# Patient Record
Sex: MALE | Race: WHITE | HISPANIC OR LATINO | Employment: FULL TIME | URBAN - METROPOLITAN AREA
[De-identification: names, ages, dates, MRNs, and addresses within clinical notes are randomized per-mention and may not be internally consistent; named-entity substitution may affect disease eponyms.]

---

## 2019-01-25 ENCOUNTER — HOSPITAL ENCOUNTER (OUTPATIENT)
Dept: LAB | Facility: MEDICAL CENTER | Age: 44
End: 2019-01-25
Attending: NURSE PRACTITIONER
Payer: COMMERCIAL

## 2019-01-25 LAB
ALBUMIN SERPL BCP-MCNC: 4.5 G/DL (ref 3.2–4.9)
ALBUMIN/GLOB SERPL: 1.7 G/DL
ALP SERPL-CCNC: 69 U/L (ref 30–99)
ALT SERPL-CCNC: 25 U/L (ref 2–50)
ANION GAP SERPL CALC-SCNC: 5 MMOL/L (ref 0–11.9)
AST SERPL-CCNC: 24 U/L (ref 12–45)
BASOPHILS # BLD AUTO: 0.9 % (ref 0–1.8)
BASOPHILS # BLD: 0.05 K/UL (ref 0–0.12)
BILIRUB SERPL-MCNC: 1.3 MG/DL (ref 0.1–1.5)
BUN SERPL-MCNC: 16 MG/DL (ref 8–22)
CALCIUM SERPL-MCNC: 9.4 MG/DL (ref 8.5–10.5)
CHLORIDE SERPL-SCNC: 106 MMOL/L (ref 96–112)
CHOLEST SERPL-MCNC: 161 MG/DL (ref 100–199)
CO2 SERPL-SCNC: 27 MMOL/L (ref 20–33)
CREAT SERPL-MCNC: 0.8 MG/DL (ref 0.5–1.4)
EOSINOPHIL # BLD AUTO: 0.49 K/UL (ref 0–0.51)
EOSINOPHIL NFR BLD: 8.7 % (ref 0–6.9)
ERYTHROCYTE [DISTWIDTH] IN BLOOD BY AUTOMATED COUNT: 41.9 FL (ref 35.9–50)
FASTING STATUS PATIENT QL REPORTED: NORMAL
GLOBULIN SER CALC-MCNC: 2.6 G/DL (ref 1.9–3.5)
GLUCOSE SERPL-MCNC: 98 MG/DL (ref 65–99)
HCT VFR BLD AUTO: 51.8 % (ref 42–52)
HDLC SERPL-MCNC: 38 MG/DL
HGB BLD-MCNC: 17.2 G/DL (ref 14–18)
IMM GRANULOCYTES # BLD AUTO: 0 K/UL (ref 0–0.11)
IMM GRANULOCYTES NFR BLD AUTO: 0 % (ref 0–0.9)
LDLC SERPL CALC-MCNC: 99 MG/DL
LYMPHOCYTES # BLD AUTO: 1.5 K/UL (ref 1–4.8)
LYMPHOCYTES NFR BLD: 26.5 % (ref 22–41)
MCH RBC QN AUTO: 30.9 PG (ref 27–33)
MCHC RBC AUTO-ENTMCNC: 33.2 G/DL (ref 33.7–35.3)
MCV RBC AUTO: 93.2 FL (ref 81.4–97.8)
MONOCYTES # BLD AUTO: 0.57 K/UL (ref 0–0.85)
MONOCYTES NFR BLD AUTO: 10.1 % (ref 0–13.4)
NEUTROPHILS # BLD AUTO: 3.04 K/UL (ref 1.82–7.42)
NEUTROPHILS NFR BLD: 53.8 % (ref 44–72)
NRBC # BLD AUTO: 0 K/UL
NRBC BLD-RTO: 0 /100 WBC
PLATELET # BLD AUTO: 194 K/UL (ref 164–446)
PMV BLD AUTO: 10 FL (ref 9–12.9)
POTASSIUM SERPL-SCNC: 3.9 MMOL/L (ref 3.6–5.5)
PROT SERPL-MCNC: 7.1 G/DL (ref 6–8.2)
RBC # BLD AUTO: 5.56 M/UL (ref 4.7–6.1)
SODIUM SERPL-SCNC: 138 MMOL/L (ref 135–145)
TRIGL SERPL-MCNC: 121 MG/DL (ref 0–149)
WBC # BLD AUTO: 5.7 K/UL (ref 4.8–10.8)

## 2019-01-25 PROCEDURE — 80053 COMPREHEN METABOLIC PANEL: CPT

## 2019-01-25 PROCEDURE — 85025 COMPLETE CBC W/AUTO DIFF WBC: CPT

## 2019-01-25 PROCEDURE — 80061 LIPID PANEL: CPT

## 2019-01-25 PROCEDURE — 36415 COLL VENOUS BLD VENIPUNCTURE: CPT

## 2021-10-16 ENCOUNTER — APPOINTMENT (OUTPATIENT)
Dept: RADIOLOGY | Facility: MEDICAL CENTER | Age: 46
End: 2021-10-16
Attending: EMERGENCY MEDICINE
Payer: COMMERCIAL

## 2021-10-16 ENCOUNTER — HOSPITAL ENCOUNTER (EMERGENCY)
Facility: MEDICAL CENTER | Age: 46
End: 2021-10-16
Attending: EMERGENCY MEDICINE
Payer: COMMERCIAL

## 2021-10-16 VITALS
TEMPERATURE: 99.3 F | RESPIRATION RATE: 18 BRPM | HEART RATE: 85 BPM | BODY MASS INDEX: 33.34 KG/M2 | OXYGEN SATURATION: 97 % | WEIGHT: 220 LBS | SYSTOLIC BLOOD PRESSURE: 139 MMHG | DIASTOLIC BLOOD PRESSURE: 91 MMHG | HEIGHT: 68 IN

## 2021-10-16 DIAGNOSIS — S82.142A CLOSED FRACTURE OF LEFT TIBIAL PLATEAU, INITIAL ENCOUNTER: ICD-10-CM

## 2021-10-16 PROCEDURE — 96374 THER/PROPH/DIAG INJ IV PUSH: CPT

## 2021-10-16 PROCEDURE — 73630 X-RAY EXAM OF FOOT: CPT | Mod: LT

## 2021-10-16 PROCEDURE — 700111 HCHG RX REV CODE 636 W/ 250 OVERRIDE (IP): Performed by: EMERGENCY MEDICINE

## 2021-10-16 PROCEDURE — 73564 X-RAY EXAM KNEE 4 OR MORE: CPT | Mod: LT

## 2021-10-16 PROCEDURE — 73700 CT LOWER EXTREMITY W/O DYE: CPT | Mod: LT

## 2021-10-16 PROCEDURE — 99285 EMERGENCY DEPT VISIT HI MDM: CPT

## 2021-10-16 PROCEDURE — A9270 NON-COVERED ITEM OR SERVICE: HCPCS | Performed by: EMERGENCY MEDICINE

## 2021-10-16 PROCEDURE — 700102 HCHG RX REV CODE 250 W/ 637 OVERRIDE(OP): Performed by: EMERGENCY MEDICINE

## 2021-10-16 RX ORDER — OXYCODONE HYDROCHLORIDE AND ACETAMINOPHEN 5; 325 MG/1; MG/1
2 TABLET ORAL ONCE
Status: COMPLETED | OUTPATIENT
Start: 2021-10-16 | End: 2021-10-16

## 2021-10-16 RX ORDER — OXYCODONE HYDROCHLORIDE AND ACETAMINOPHEN 5; 325 MG/1; MG/1
1 TABLET ORAL EVERY 6 HOURS PRN
Qty: 20 TABLET | Refills: 0 | Status: SHIPPED | OUTPATIENT
Start: 2021-10-16 | End: 2021-10-21

## 2021-10-16 RX ORDER — MORPHINE SULFATE 4 MG/ML
4 INJECTION, SOLUTION INTRAMUSCULAR; INTRAVENOUS ONCE
Status: COMPLETED | OUTPATIENT
Start: 2021-10-16 | End: 2021-10-16

## 2021-10-16 RX ADMIN — MORPHINE SULFATE 4 MG: 4 INJECTION INTRAVENOUS at 18:21

## 2021-10-16 RX ADMIN — OXYCODONE HYDROCHLORIDE AND ACETAMINOPHEN 2 TABLET: 5; 325 TABLET ORAL at 20:00

## 2021-10-16 NOTE — ED TRIAGE NOTES
"Chief Complaint   Patient presents with   • T-5000     Pt states he fell walking out of the doorway from his house onto his left knee. Denies hitting his head. CMS intact.     Pt wheeled back to triage room. GCS 15. NAD. VSS on RA    Pt denies s/sx of COVID or recent exposure. Pt is vaccinated against COVID    Pt returned to lobby. Pt educated on triage process. Pt understands to notify staff of any changes or worsening of symptoms.    /99   Pulse 82   Temp 36.3 °C (97.4 °F) (Temporal)   Resp 18   Ht 1.727 m (5' 8\")   Wt 99.8 kg (220 lb)   SpO2 96%   BMI 33.45 kg/m²     "

## 2021-10-17 NOTE — ED PROVIDER NOTES
"ED Provider Note    CHIEF COMPLAINT   Chief Complaint   Patient presents with   • T-5000     Pt states he fell walking out of the doorway from his house onto his left knee. Denies hitting his head. CMS intact.       HPI   Anand Padilla is a 46 y.o. male who presents with severe left knee pain and swelling since tripping going at his door and falling onto his knee on concrete stairs.  He also has pain of the right great toe.  Pain is quite severe and he cannot walk.  No blood thinner use.  Denies other injury to head neck back chest or abdomen.    REVIEW OF SYSTEMS   Pertinent positives: Very left knee pain and swelling, fall, direct blow, left great toe pain.  Pertinent negatives: Hip pain, spine pain, weakness, numbness.    PAST MEDICAL HISTORY   Denies    CURRENT MEDICATIONS   Home Medications     Reviewed by Anjali Medley R.N. (Registered Nurse) on 10/16/21 at 1601  Med List Status: Partial   Medication Last Dose Status        Patient Jeffy Taking any Medications                       ALLERGIES   Not on File    PHYSICAL EXAM  VITAL SIGNS: /99   Pulse 82   Temp 36.3 °C (97.4 °F) (Temporal)   Resp 18   Ht 1.727 m (5' 8\")   Wt 99.8 kg (220 lb)   SpO2 96%   BMI 33.45 kg/m²   Constitutional: Well developed, Well nourished, well-appearing, appears to be in pain.  HENT: Atraumatic.   Cardiovascular:  Peripheral pulses dorsalis pedis pulses 2-3+ bilaterally.  Skin: Intact without wound or bruising.  Erythema and scale distal to the patella left knee chronic.   Musculoskeletal: Left knee effusion, tenderness distal to the patella and on the medial and lateral joint line, crepitus with mild flexion and extension palpable in the popliteal fossa.  Patient refuses full extension of the knee.  Tenderness over the left great toe without deformity or crepitus.  Remainder of thigh leg and ankle atraumatic  Compartments: Soft thigh and leg compartments  Neurologic: Toe flexion extension and sensation " preserved    RADIOLOGY/PROCEDURES  CT-KNEE W/O PLUS RECONS LEFT   Final Result      1.  Highly comminuted, displaced and depressed lateral tibial plateau fracture.   2.  Mild flattening and subchondral sclerosis involving the lateral distal femoral condyle suggesting a mild impaction fracture/injury.   3.  Lipohemarthrosis.      DX-FOOT-COMPLETE 3+ LEFT   Final Result      No acute osseous abnormality.      DX-KNEE COMPLETE 4+ LEFT   Final Result      1.  Acute, comminuted, split-depression fracture of the lateral tibial plateau.   2.  Lipohemarthrosis.          COURSE & MEDICAL DECISION MAKING  Case discussed twice with Dr. Kaushik hickman.    Patient placed in knee immobilizer and give crutches.    Medications   morphine (pf) 4 mg/mL injection 4 mg (4 mg Intravenous Given 10/16/21 1821)   oxyCODONE-acetaminophen (PERCOCET) 5-325 MG per tablet 2 Tablet (2 Tablets Oral Given 10/16/21 2000)     This patient presents after a fall onto his knee with a hemarthrosis and a lateral tibial plateau fracture.  He has a sprained or contused left great toe and no other injury to head, spine, torso or extremities.  He is neurovascularly intact    PLAN:  Discharge Medication List as of 10/16/2021  7:53 PM      START taking these medications    Details   oxyCODONE-acetaminophen (PERCOCET) 5-325 MG Tab Take 1 Tablet by mouth every 6 hours as needed (moderate to severe pain) for up to 5 days., Disp-20 Tablet, R-0, Normal           Prescription monitoring queried and score 0  Opiate risk tool utilized and patient low risk  Informed consent obtained for opiate analgesic  Indication opiate analgesic fracture pain    NSAIDS  Non weight bearing  Tibial plateau fracture handout given    Denis Faulkner M.D.  [address] [place] [address]  [phone]      Call is Assistant Jack at [phone] on Monday for appointment        CONDITION: good    FINAL IMPRESSION  1. Closed fracture of left tibial plateau, initial encounter    2.       Left great toe sprain  3.      Fall, initial encounter        Electronically signed by: Tank Javed M.D., 10/16/2021 6:03 PM

## 2021-10-17 NOTE — ED NOTES
Discharge instructions given to pt regarding follow up appointments, tibial fracture and reasons to return to the ED. Education regarding medications for pain given. All education given using  ipad. Pt verbalized understanding. PIV removed. Knee immobilizer applied to left knee. crutches provided. Documents signed

## 2021-10-17 NOTE — DISCHARGE INSTRUCTIONS
Floridalma ibuprofen y percocet contra dolor.  Llame Vance con Dr. Kaushik Celaya para sergio.  No camina en la pierna.

## 2021-10-18 PROBLEM — S82.142A TIBIAL PLATEAU FRACTURE, LEFT: Status: ACTIVE | Noted: 2021-10-18

## 2021-10-19 ENCOUNTER — HOSPITAL ENCOUNTER (OUTPATIENT)
Facility: MEDICAL CENTER | Age: 46
End: 2021-10-19
Attending: ORTHOPAEDIC SURGERY | Admitting: ORTHOPAEDIC SURGERY
Payer: COMMERCIAL

## 2021-10-19 ENCOUNTER — ANESTHESIA (OUTPATIENT)
Dept: SURGERY | Facility: MEDICAL CENTER | Age: 46
End: 2021-10-19
Payer: COMMERCIAL

## 2021-10-19 ENCOUNTER — APPOINTMENT (OUTPATIENT)
Dept: RADIOLOGY | Facility: MEDICAL CENTER | Age: 46
End: 2021-10-19
Attending: ORTHOPAEDIC SURGERY
Payer: COMMERCIAL

## 2021-10-19 ENCOUNTER — ANESTHESIA EVENT (OUTPATIENT)
Dept: SURGERY | Facility: MEDICAL CENTER | Age: 46
End: 2021-10-19
Payer: COMMERCIAL

## 2021-10-19 VITALS
WEIGHT: 197.75 LBS | TEMPERATURE: 98 F | BODY MASS INDEX: 29.97 KG/M2 | RESPIRATION RATE: 15 BRPM | HEART RATE: 95 BPM | SYSTOLIC BLOOD PRESSURE: 136 MMHG | OXYGEN SATURATION: 96 % | HEIGHT: 68 IN | DIASTOLIC BLOOD PRESSURE: 71 MMHG

## 2021-10-19 DIAGNOSIS — S82.142A CLOSED FRACTURE OF LEFT TIBIAL PLATEAU, INITIAL ENCOUNTER: ICD-10-CM

## 2021-10-19 LAB
ANION GAP SERPL CALC-SCNC: 9 MMOL/L (ref 7–16)
BUN SERPL-MCNC: 8 MG/DL (ref 8–22)
CALCIUM SERPL-MCNC: 8.5 MG/DL (ref 8.5–10.5)
CHLORIDE SERPL-SCNC: 103 MMOL/L (ref 96–112)
CO2 SERPL-SCNC: 23 MMOL/L (ref 20–33)
CREAT SERPL-MCNC: 0.59 MG/DL (ref 0.5–1.4)
ERYTHROCYTE [DISTWIDTH] IN BLOOD BY AUTOMATED COUNT: 41.9 FL (ref 35.9–50)
EXTERNAL QUALITY CONTROL: NORMAL
GLUCOSE SERPL-MCNC: 127 MG/DL (ref 65–99)
HCT VFR BLD AUTO: 46.3 % (ref 42–52)
HGB BLD-MCNC: 15.4 G/DL (ref 14–18)
MCH RBC QN AUTO: 31.2 PG (ref 27–33)
MCHC RBC AUTO-ENTMCNC: 33.3 G/DL (ref 33.7–35.3)
MCV RBC AUTO: 93.9 FL (ref 81.4–97.8)
PLATELET # BLD AUTO: 191 K/UL (ref 164–446)
PMV BLD AUTO: 9.3 FL (ref 9–12.9)
POTASSIUM SERPL-SCNC: 4 MMOL/L (ref 3.6–5.5)
RBC # BLD AUTO: 4.93 M/UL (ref 4.7–6.1)
SARS-COV+SARS-COV-2 AG RESP QL IA.RAPID: NEGATIVE
SODIUM SERPL-SCNC: 135 MMOL/L (ref 135–145)
WBC # BLD AUTO: 6.5 K/UL (ref 4.8–10.8)

## 2021-10-19 PROCEDURE — 160036 HCHG PACU - EA ADDL 30 MINS PHASE I: Performed by: ORTHOPAEDIC SURGERY

## 2021-10-19 PROCEDURE — 27535 TREAT KNEE FRACTURE: CPT | Mod: 80ROC,LT | Performed by: STUDENT IN AN ORGANIZED HEALTH CARE EDUCATION/TRAINING PROGRAM

## 2021-10-19 PROCEDURE — 700105 HCHG RX REV CODE 258: Performed by: ORTHOPAEDIC SURGERY

## 2021-10-19 PROCEDURE — 700111 HCHG RX REV CODE 636 W/ 250 OVERRIDE (IP): Performed by: ANESTHESIOLOGY

## 2021-10-19 PROCEDURE — 160041 HCHG SURGERY MINUTES - EA ADDL 1 MIN LEVEL 4: Performed by: ORTHOPAEDIC SURGERY

## 2021-10-19 PROCEDURE — 85027 COMPLETE CBC AUTOMATED: CPT

## 2021-10-19 PROCEDURE — 160048 HCHG OR STATISTICAL LEVEL 1-5: Performed by: ORTHOPAEDIC SURGERY

## 2021-10-19 PROCEDURE — 501838 HCHG SUTURE GENERAL: Performed by: ORTHOPAEDIC SURGERY

## 2021-10-19 PROCEDURE — 73560 X-RAY EXAM OF KNEE 1 OR 2: CPT | Mod: LT

## 2021-10-19 PROCEDURE — 700102 HCHG RX REV CODE 250 W/ 637 OVERRIDE(OP): Performed by: ANESTHESIOLOGY

## 2021-10-19 PROCEDURE — 160035 HCHG PACU - 1ST 60 MINS PHASE I: Performed by: ORTHOPAEDIC SURGERY

## 2021-10-19 PROCEDURE — 160029 HCHG SURGERY MINUTES - 1ST 30 MINS LEVEL 4: Performed by: ORTHOPAEDIC SURGERY

## 2021-10-19 PROCEDURE — 160025 RECOVERY II MINUTES (STATS): Performed by: ORTHOPAEDIC SURGERY

## 2021-10-19 PROCEDURE — C1713 ANCHOR/SCREW BN/BN,TIS/BN: HCPCS | Performed by: ORTHOPAEDIC SURGERY

## 2021-10-19 PROCEDURE — 160046 HCHG PACU - 1ST 60 MINS PHASE II: Performed by: ORTHOPAEDIC SURGERY

## 2021-10-19 PROCEDURE — 27535 TREAT KNEE FRACTURE: CPT | Mod: LT | Performed by: ORTHOPAEDIC SURGERY

## 2021-10-19 PROCEDURE — 87426 SARSCOV CORONAVIRUS AG IA: CPT | Performed by: ORTHOPAEDIC SURGERY

## 2021-10-19 PROCEDURE — L1830 KO IMMOB CANVAS LONG PRE OTS: HCPCS | Performed by: ORTHOPAEDIC SURGERY

## 2021-10-19 PROCEDURE — 160002 HCHG RECOVERY MINUTES (STAT): Performed by: ORTHOPAEDIC SURGERY

## 2021-10-19 PROCEDURE — 500881 HCHG PACK, EXTREMITY: Performed by: ORTHOPAEDIC SURGERY

## 2021-10-19 PROCEDURE — 20933 HEMICRT INTRCLRY ALGRFT PRTL: CPT | Mod: 59 | Performed by: ORTHOPAEDIC SURGERY

## 2021-10-19 PROCEDURE — 700101 HCHG RX REV CODE 250: Performed by: ORTHOPAEDIC SURGERY

## 2021-10-19 PROCEDURE — 80048 BASIC METABOLIC PNL TOTAL CA: CPT

## 2021-10-19 PROCEDURE — A9270 NON-COVERED ITEM OR SERVICE: HCPCS | Performed by: ANESTHESIOLOGY

## 2021-10-19 PROCEDURE — 700111 HCHG RX REV CODE 636 W/ 250 OVERRIDE (IP): Performed by: ORTHOPAEDIC SURGERY

## 2021-10-19 PROCEDURE — 700101 HCHG RX REV CODE 250: Performed by: ANESTHESIOLOGY

## 2021-10-19 PROCEDURE — 20933 HEMICRT INTRCLRY ALGRFT PRTL: CPT | Mod: 80ROC,59 | Performed by: STUDENT IN AN ORGANIZED HEALTH CARE EDUCATION/TRAINING PROGRAM

## 2021-10-19 PROCEDURE — 160009 HCHG ANES TIME/MIN: Performed by: ORTHOPAEDIC SURGERY

## 2021-10-19 PROCEDURE — A6454 SELF-ADHER BAND W>=3" <5"/YD: HCPCS | Performed by: ORTHOPAEDIC SURGERY

## 2021-10-19 DEVICE — SCREW 3.5 MM NON-LOCKING TI X 85MM LONG (6TX4=24): Type: IMPLANTABLE DEVICE | Site: LEG | Status: FUNCTIONAL

## 2021-10-19 DEVICE — PLATE LATERIAL PROXIMAL TIBIA 6H LEFT (2TX1=2): Type: IMPLANTABLE DEVICE | Site: LEG | Status: FUNCTIONAL

## 2021-10-19 DEVICE — GRAFT BONE CANCELLOUS FREEZE DRIED CHIPS ALLOSOURCE 15CC (1EA): Type: IMPLANTABLE DEVICE | Site: LEG | Status: FUNCTIONAL

## 2021-10-19 DEVICE — SCREW 3.5 MM NON-LOCKING TI X 36MM LONG (6TX8=48): Type: IMPLANTABLE DEVICE | Site: LEG | Status: FUNCTIONAL

## 2021-10-19 DEVICE — SCREW 3.5 MM NON-LOCKING TI X 70MM LONG (6TX4=24): Type: IMPLANTABLE DEVICE | Site: LEG | Status: FUNCTIONAL

## 2021-10-19 DEVICE — WIRE 2.0MMX150MM K-WIRE (10 PACK) (6TX10=60): Type: IMPLANTABLE DEVICE | Site: LEG | Status: FUNCTIONAL

## 2021-10-19 DEVICE — SCREW 3.5 MM NON-LOCKING TI X 65MM LONG (6TX8=48): Type: IMPLANTABLE DEVICE | Site: LEG | Status: FUNCTIONAL

## 2021-10-19 DEVICE — SCREW 3.5 MM NON-LOCKING TI X 40MM LONG (6TX8=48): Type: IMPLANTABLE DEVICE | Site: LEG | Status: FUNCTIONAL

## 2021-10-19 RX ORDER — GABAPENTIN 300 MG/1
300 CAPSULE ORAL ONCE
Status: COMPLETED | OUTPATIENT
Start: 2021-10-19 | End: 2021-10-19

## 2021-10-19 RX ORDER — HYDROMORPHONE HYDROCHLORIDE 1 MG/ML
0.4 INJECTION, SOLUTION INTRAMUSCULAR; INTRAVENOUS; SUBCUTANEOUS
Status: DISCONTINUED | OUTPATIENT
Start: 2021-10-19 | End: 2021-10-19 | Stop reason: HOSPADM

## 2021-10-19 RX ORDER — ONDANSETRON 2 MG/ML
4 INJECTION INTRAMUSCULAR; INTRAVENOUS
Status: DISCONTINUED | OUTPATIENT
Start: 2021-10-19 | End: 2021-10-19 | Stop reason: HOSPADM

## 2021-10-19 RX ORDER — ONDANSETRON 2 MG/ML
INJECTION INTRAMUSCULAR; INTRAVENOUS PRN
Status: DISCONTINUED | OUTPATIENT
Start: 2021-10-19 | End: 2021-10-19 | Stop reason: SURG

## 2021-10-19 RX ORDER — MAGNESIUM HYDROXIDE 1200 MG/15ML
LIQUID ORAL
Status: COMPLETED | OUTPATIENT
Start: 2021-10-19 | End: 2021-10-19

## 2021-10-19 RX ORDER — OXYCODONE HCL 5 MG/5 ML
10 SOLUTION, ORAL ORAL
Status: COMPLETED | OUTPATIENT
Start: 2021-10-19 | End: 2021-10-19

## 2021-10-19 RX ORDER — HYDRALAZINE HYDROCHLORIDE 20 MG/ML
5 INJECTION INTRAMUSCULAR; INTRAVENOUS
Status: DISCONTINUED | OUTPATIENT
Start: 2021-10-19 | End: 2021-10-19 | Stop reason: HOSPADM

## 2021-10-19 RX ORDER — SODIUM CHLORIDE, SODIUM LACTATE, POTASSIUM CHLORIDE, CALCIUM CHLORIDE 600; 310; 30; 20 MG/100ML; MG/100ML; MG/100ML; MG/100ML
INJECTION, SOLUTION INTRAVENOUS CONTINUOUS
Status: DISCONTINUED | OUTPATIENT
Start: 2021-10-19 | End: 2021-10-19 | Stop reason: HOSPADM

## 2021-10-19 RX ORDER — HYDROMORPHONE HYDROCHLORIDE 2 MG/ML
INJECTION, SOLUTION INTRAMUSCULAR; INTRAVENOUS; SUBCUTANEOUS PRN
Status: DISCONTINUED | OUTPATIENT
Start: 2021-10-19 | End: 2021-10-19 | Stop reason: SURG

## 2021-10-19 RX ORDER — HYDROMORPHONE HYDROCHLORIDE 1 MG/ML
0.2 INJECTION, SOLUTION INTRAMUSCULAR; INTRAVENOUS; SUBCUTANEOUS
Status: DISCONTINUED | OUTPATIENT
Start: 2021-10-19 | End: 2021-10-19 | Stop reason: HOSPADM

## 2021-10-19 RX ORDER — HYDROMORPHONE HYDROCHLORIDE 1 MG/ML
0.5 INJECTION, SOLUTION INTRAMUSCULAR; INTRAVENOUS; SUBCUTANEOUS
Status: DISCONTINUED | OUTPATIENT
Start: 2021-10-19 | End: 2021-10-19 | Stop reason: HOSPADM

## 2021-10-19 RX ORDER — ACETAMINOPHEN 500 MG
500 TABLET ORAL ONCE
Status: COMPLETED | OUTPATIENT
Start: 2021-10-19 | End: 2021-10-19

## 2021-10-19 RX ORDER — MIDAZOLAM HYDROCHLORIDE 1 MG/ML
INJECTION INTRAMUSCULAR; INTRAVENOUS PRN
Status: DISCONTINUED | OUTPATIENT
Start: 2021-10-19 | End: 2021-10-19 | Stop reason: SURG

## 2021-10-19 RX ORDER — ACETAMINOPHEN 500 MG
1000 TABLET ORAL ONCE
Status: DISCONTINUED | OUTPATIENT
Start: 2021-10-19 | End: 2021-10-19

## 2021-10-19 RX ORDER — DEXAMETHASONE SODIUM PHOSPHATE 4 MG/ML
INJECTION, SOLUTION INTRA-ARTICULAR; INTRALESIONAL; INTRAMUSCULAR; INTRAVENOUS; SOFT TISSUE PRN
Status: DISCONTINUED | OUTPATIENT
Start: 2021-10-19 | End: 2021-10-19 | Stop reason: SURG

## 2021-10-19 RX ORDER — CEFAZOLIN SODIUM 1 G/3ML
2 INJECTION, POWDER, FOR SOLUTION INTRAMUSCULAR; INTRAVENOUS ONCE
Status: DISCONTINUED | OUTPATIENT
Start: 2021-10-19 | End: 2021-10-19 | Stop reason: HOSPADM

## 2021-10-19 RX ORDER — CEFAZOLIN SODIUM 1 G/3ML
INJECTION, POWDER, FOR SOLUTION INTRAMUSCULAR; INTRAVENOUS PRN
Status: DISCONTINUED | OUTPATIENT
Start: 2021-10-19 | End: 2021-10-19 | Stop reason: SURG

## 2021-10-19 RX ORDER — CELECOXIB 200 MG/1
200 CAPSULE ORAL ONCE
Status: COMPLETED | OUTPATIENT
Start: 2021-10-19 | End: 2021-10-19

## 2021-10-19 RX ORDER — LIDOCAINE HYDROCHLORIDE 20 MG/ML
INJECTION, SOLUTION EPIDURAL; INFILTRATION; INTRACAUDAL; PERINEURAL PRN
Status: DISCONTINUED | OUTPATIENT
Start: 2021-10-19 | End: 2021-10-19 | Stop reason: SURG

## 2021-10-19 RX ORDER — OXYCODONE AND ACETAMINOPHEN 10; 325 MG/1; MG/1
1-2 TABLET ORAL EVERY 4 HOURS PRN
Qty: 15 TABLET | Refills: 0 | Status: SHIPPED | OUTPATIENT
Start: 2021-10-19 | End: 2021-11-18

## 2021-10-19 RX ORDER — MEPERIDINE HYDROCHLORIDE 25 MG/ML
12.5 INJECTION INTRAMUSCULAR; INTRAVENOUS; SUBCUTANEOUS
Status: DISCONTINUED | OUTPATIENT
Start: 2021-10-19 | End: 2021-10-19 | Stop reason: HOSPADM

## 2021-10-19 RX ORDER — HALOPERIDOL 5 MG/ML
1 INJECTION INTRAMUSCULAR
Status: DISCONTINUED | OUTPATIENT
Start: 2021-10-19 | End: 2021-10-19 | Stop reason: HOSPADM

## 2021-10-19 RX ORDER — DIPHENHYDRAMINE HYDROCHLORIDE 50 MG/ML
12.5 INJECTION INTRAMUSCULAR; INTRAVENOUS
Status: DISCONTINUED | OUTPATIENT
Start: 2021-10-19 | End: 2021-10-19 | Stop reason: HOSPADM

## 2021-10-19 RX ORDER — OXYCODONE HCL 5 MG/5 ML
5 SOLUTION, ORAL ORAL
Status: COMPLETED | OUTPATIENT
Start: 2021-10-19 | End: 2021-10-19

## 2021-10-19 RX ORDER — SODIUM CHLORIDE, SODIUM LACTATE, POTASSIUM CHLORIDE, CALCIUM CHLORIDE 600; 310; 30; 20 MG/100ML; MG/100ML; MG/100ML; MG/100ML
INJECTION, SOLUTION INTRAVENOUS CONTINUOUS
Status: ACTIVE | OUTPATIENT
Start: 2021-10-19 | End: 2021-10-19

## 2021-10-19 RX ORDER — DEXMEDETOMIDINE HYDROCHLORIDE 100 UG/ML
INJECTION, SOLUTION INTRAVENOUS PRN
Status: DISCONTINUED | OUTPATIENT
Start: 2021-10-19 | End: 2021-10-19 | Stop reason: SURG

## 2021-10-19 RX ORDER — KETOROLAC TROMETHAMINE 30 MG/ML
INJECTION, SOLUTION INTRAMUSCULAR; INTRAVENOUS
Status: DISCONTINUED | OUTPATIENT
Start: 2021-10-19 | End: 2021-10-19 | Stop reason: HOSPADM

## 2021-10-19 RX ORDER — MORPHINE SULFATE 1 MG/ML
INJECTION, SOLUTION EPIDURAL; INTRATHECAL; INTRAVENOUS
Status: DISCONTINUED | OUTPATIENT
Start: 2021-10-19 | End: 2021-10-19 | Stop reason: HOSPADM

## 2021-10-19 RX ORDER — MAGNESIUM SULFATE HEPTAHYDRATE 500 MG/ML
INJECTION, SOLUTION INTRAMUSCULAR; INTRAVENOUS PRN
Status: DISCONTINUED | OUTPATIENT
Start: 2021-10-19 | End: 2021-10-19 | Stop reason: SURG

## 2021-10-19 RX ORDER — SODIUM CHLORIDE 9 MG/ML
INJECTION, SOLUTION INTRAMUSCULAR; INTRAVENOUS; SUBCUTANEOUS
Status: DISCONTINUED | OUTPATIENT
Start: 2021-10-19 | End: 2021-10-19 | Stop reason: HOSPADM

## 2021-10-19 RX ORDER — BUPIVACAINE HYDROCHLORIDE 2.5 MG/ML
INJECTION, SOLUTION EPIDURAL; INFILTRATION; INTRACAUDAL
Status: DISCONTINUED | OUTPATIENT
Start: 2021-10-19 | End: 2021-10-19 | Stop reason: HOSPADM

## 2021-10-19 RX ADMIN — FENTANYL CITRATE 50 MCG: 50 INJECTION INTRAMUSCULAR; INTRAVENOUS at 13:55

## 2021-10-19 RX ADMIN — DEXMEDETOMIDINE 50 MCG: 200 INJECTION, SOLUTION INTRAVENOUS at 11:25

## 2021-10-19 RX ADMIN — PROPOFOL 200 MG: 10 INJECTION, EMULSION INTRAVENOUS at 11:04

## 2021-10-19 RX ADMIN — HYDROMORPHONE HYDROCHLORIDE 1 MG: 2 INJECTION, SOLUTION INTRAMUSCULAR; INTRAVENOUS; SUBCUTANEOUS at 11:04

## 2021-10-19 RX ADMIN — LIDOCAINE HYDROCHLORIDE 0.5 ML: 10 INJECTION, SOLUTION EPIDURAL; INFILTRATION; INTRACAUDAL; PERINEURAL at 09:01

## 2021-10-19 RX ADMIN — SODIUM CHLORIDE, POTASSIUM CHLORIDE, SODIUM LACTATE AND CALCIUM CHLORIDE: 600; 310; 30; 20 INJECTION, SOLUTION INTRAVENOUS at 12:20

## 2021-10-19 RX ADMIN — MIDAZOLAM HYDROCHLORIDE 2 MG: 1 INJECTION, SOLUTION INTRAMUSCULAR; INTRAVENOUS at 11:00

## 2021-10-19 RX ADMIN — ACETAMINOPHEN 500 MG: 500 TABLET ORAL at 09:10

## 2021-10-19 RX ADMIN — DEXAMETHASONE SODIUM PHOSPHATE 8 MG: 4 INJECTION, SOLUTION INTRA-ARTICULAR; INTRALESIONAL; INTRAMUSCULAR; INTRAVENOUS; SOFT TISSUE at 11:08

## 2021-10-19 RX ADMIN — CEFAZOLIN 2 G: 330 INJECTION, POWDER, FOR SOLUTION INTRAMUSCULAR; INTRAVENOUS at 11:00

## 2021-10-19 RX ADMIN — CELECOXIB 200 MG: 200 CAPSULE ORAL at 09:04

## 2021-10-19 RX ADMIN — FENTANYL CITRATE 50 MCG: 50 INJECTION INTRAMUSCULAR; INTRAVENOUS at 14:35

## 2021-10-19 RX ADMIN — SODIUM CHLORIDE, POTASSIUM CHLORIDE, SODIUM LACTATE AND CALCIUM CHLORIDE: 600; 310; 30; 20 INJECTION, SOLUTION INTRAVENOUS at 09:01

## 2021-10-19 RX ADMIN — LIDOCAINE HYDROCHLORIDE 100 MG: 20 INJECTION, SOLUTION EPIDURAL; INFILTRATION; INTRACAUDAL at 11:04

## 2021-10-19 RX ADMIN — OXYCODONE HYDROCHLORIDE 10 MG: 5 SOLUTION ORAL at 13:38

## 2021-10-19 RX ADMIN — FENTANYL CITRATE 25 MCG: 50 INJECTION INTRAMUSCULAR; INTRAVENOUS at 15:02

## 2021-10-19 RX ADMIN — HYDROMORPHONE HYDROCHLORIDE 1 MG: 2 INJECTION, SOLUTION INTRAMUSCULAR; INTRAVENOUS; SUBCUTANEOUS at 11:11

## 2021-10-19 RX ADMIN — MAGNESIUM SULFATE HEPTAHYDRATE 3 G: 500 INJECTION, SOLUTION INTRAMUSCULAR; INTRAVENOUS at 11:08

## 2021-10-19 RX ADMIN — ONDANSETRON 4 MG: 2 INJECTION INTRAMUSCULAR; INTRAVENOUS at 12:40

## 2021-10-19 RX ADMIN — GABAPENTIN 300 MG: 300 CAPSULE ORAL at 09:04

## 2021-10-19 ASSESSMENT — PAIN DESCRIPTION - PAIN TYPE
TYPE: ACUTE PAIN
TYPE: SURGICAL PAIN
TYPE: SURGICAL PAIN

## 2021-10-19 ASSESSMENT — PAIN SCALES - GENERAL: PAIN_LEVEL: 2

## 2021-10-19 NOTE — ANESTHESIA PROCEDURE NOTES
Airway    Date/Time: 10/19/2021 11:04 AM  Performed by: Arnaud Sharp M.D.  Authorized by: Arnaud Sharp M.D.     Location:  OR  Urgency:  Elective  Indications for Airway Management:  Anesthesia      Spontaneous Ventilation: absent    Sedation Level:  Deep  Preoxygenated: Yes    Final Airway Type:  Supraglottic airway  Final Supraglottic Airway:  Standard LMA    SGA Size:  4  Number of Attempts at Approach:  1   Atraumatic insertion, good seal

## 2021-10-19 NOTE — ANESTHESIA PREPROCEDURE EVALUATION
Mild asthma. Denies: MI/CHF/smoking/CVA/DM/CKD      Relevant Problems   No relevant active problems       Physical Exam    Airway   Mallampati: II  TM distance: >3 FB  Neck ROM: full       Cardiovascular - normal exam  Rhythm: regular  Rate: normal  (-) murmur     Dental - normal exam           Pulmonary - normal exam  Breath sounds clear to auscultation     Abdominal    Neurological - normal exam                 Anesthesia Plan    ASA 2       Plan - general       Airway plan will be LMA          Induction: intravenous    Postoperative Plan: Postoperative administration of opioids is intended.    Pertinent diagnostic labs and testing reviewed    Informed Consent:    Anesthetic plan and risks discussed with patient.    Use of blood products discussed with: patient whom consented to blood products.

## 2021-10-19 NOTE — ANESTHESIA POSTPROCEDURE EVALUATION
Patient: Anand Padilla    Procedure Summary     Date: 10/19/21 Room / Location: [facility] OR 08 / SURGERY [facility]    Anesthesia Start: 1100 Anesthesia Stop: 1255    Procedures:       ORIF, FRACTURE, TIBIA, PLATEAU LEFT (Left Knee)      BONE GRAFT- ALLOGRAFT (Left Leg Lower) Diagnosis:       Tibial plateau fracture, left      (Tibial plateau fracture, left [S82.142A])    Surgeons: Denis Faulkner M.D. Responsible Provider: Arnaud Sharp M.D.    Anesthesia Type: general ASA Status: 2          Final Anesthesia Type: general  Last vitals  BP   Blood Pressure: 124/83    Temp   36.8 °C (98.2 °F)    Pulse   90   Resp   18    SpO2   92 %      Anesthesia Post Evaluation    Patient location during evaluation: PACU  Patient participation: complete - patient participated  Level of consciousness: awake and alert  Pain score: 2    Airway patency: patent  Anesthetic complications: no  Cardiovascular status: hemodynamically stable  Respiratory status: acceptable  Hydration status: euvolemic    PONV: none          No complications documented.     Nurse Pain Score: 2 (NPRS)

## 2021-10-19 NOTE — H&P
Surgery Orthopedic History & Physical Note    Date  10/19/2021    Primary Care Physician  Pcp Pt States None    CC  Pre-Op Diagnosis Codes:     * Tibial plateau fracture, left [S82.142A]    HPI  This is a 46 y.o. male who presented with with left tibial plateau fracture, had CT and discgarged for delayed fiation.    Past Medical History:   Diagnosis Date   • Asthma        History reviewed. No pertinent surgical history.    Current Facility-Administered Medications   Medication Dose Route Frequency Provider Last Rate Last Admin   • lidocaine (XYLOCAINE) 1 % injection 0.5 mL  0.5 mL Intradermal Once PRN Denis Faulkner M.D.   0.5 mL at 10/19/21 0901   • ceFAZolin (ANCEF) injection 2 g  2 g Intravenous Once Denis Faulkner M.D.       • bupivacaine 0.25% (SENSORCAINE-MARCAINE) pf injection    Intra-Op Once PRN Denis Faulkner M.D.   60 mL at 10/19/21 1141   • ketorolac (TORADOL) injection    Intra-Op Once PRN Denis Faulkner M.D.   30 mg at 10/19/21 1142   • morphine pf (DURAMORPH) injection    Intra-Op Once PRN Denis Faulkner M.D.   4 mg at 10/19/21 1142   • normal saline (PF)    Intra-Op Once PRN Denis Faulkner M.D.   40 mL at 10/19/21 1142   • sodium chloride for irrigation 0.9 % irrigant    Intra-Op Continuous Denis Faulkner M.D.   1,000 mL at 10/19/21 1143     Facility-Administered Medications Ordered in Other Encounters   Medication Dose Route Frequency Provider Last Rate Last Admin   • ceFAZolin (ANCEF) injection   Intravenous PRN Arnaud Sharp M.D.   2 g at 10/19/21 1100   • HYDROmorphone (DILAUDID) injection   Intravenous PRN Arnaud Sharp M.D.   1 mg at 10/19/21 1111   • midazolam (Versed) 2 MG/2ML injection   Intravenous PRN Arnaud Sharp M.D.   2 mg at 10/19/21 1100   • propofol (DIPRIVAN) injection   Intravenous PRN Arnaud Sharp M.D.   200 mg at 10/19/21 1104   • lidocaine PF (XYLOCAINE-MPF) 2 % injection PF   Intravenous PRN Arnaud Sharp M.D.   100 mg at 10/19/21  1104   • dexamethasone (DECADRON) injection   Intravenous PRN Arnaud Sharp M.D.   8 mg at 10/19/21 1108   • magnesium sulfate 50 % injection   Intravenous PRN Arnaud Sharp M.D.   3 g at 10/19/21 1108   • dexmedetomidine (Precedex) injection   Intravenous PRN Arnaud Sharp M.D.   50 mcg at 10/19/21 1125   • ondansetron (ZOFRAN) syringe/vial injection   Intravenous PRN Arnaud Sharp M.D.   4 mg at 10/19/21 1240       Social History     Socioeconomic History   • Marital status: Single     Spouse name: Not on file   • Number of children: Not on file   • Years of education: Not on file   • Highest education level: Not on file   Occupational History   • Not on file   Tobacco Use   • Smoking status: Never Smoker   • Smokeless tobacco: Never Used   Vaping Use   • Vaping Use: Never used   Substance and Sexual Activity   • Alcohol use: Yes     Comment: occasional   • Drug use: Never   • Sexual activity: Not on file   Other Topics Concern   • Not on file   Social History Narrative   • Not on file     Social Determinants of Health     Financial Resource Strain:    • Difficulty of Paying Living Expenses:    Food Insecurity:    • Worried About Running Out of Food in the Last Year:    • Ran Out of Food in the Last Year:    Transportation Needs:    • Lack of Transportation (Medical):    • Lack of Transportation (Non-Medical):    Physical Activity:    • Days of Exercise per Week:    • Minutes of Exercise per Session:    Stress:    • Feeling of Stress :    Social Connections:    • Frequency of Communication with Friends and Family:    • Frequency of Social Gatherings with Friends and Family:    • Attends Jain Services:    • Active Member of Clubs or Organizations:    • Attends Club or Organization Meetings:    • Marital Status:    Intimate Partner Violence:    • Fear of Current or Ex-Partner:    • Emotionally Abused:    • Physically Abused:    • Sexually Abused:        History reviewed. No pertinent  family history.    Allergies  Patient has no known allergies.    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   no brusing or skin that contraindicates surgery    Remainder of ortho exam negative    Vital Signs  Blood Pressure: 130/88   Temperature: 35.9 °C (96.7 °F)   Pulse: (!) 104   Respiration: 18   Pulse Oximetry: 93 %       Labs:  Recent Labs     10/19/21  0848   WBC 6.5   RBC 4.93   HEMOGLOBIN 15.4   HEMATOCRIT 46.3   MCV 93.9   MCH 31.2   MCHC 33.3*   RDW 41.9   PLATELETCT 191   MPV 9.3     Recent Labs     10/19/21  0848   SODIUM 135   POTASSIUM 4.0   CHLORIDE 103   CO2 23   GLUCOSE 127*   BUN 8   CREATININE 0.59   CALCIUM 8.5               Radiology:  DX-KNEE 2- LEFT   Final Result      Intraoperative image as above described.      DX-PORTABLE FLUORO > 1 HOUR    (Results Pending)         Assessment/Plan:  Pre-Op Diagnosis Codes:     * Tibial plateau fracture, left [S82.142A]  Procedure(s):  ORIF, FRACTURE, TIBIA, PLATEAU LEFT  BONE GRAFT- ALLOGRAFT

## 2021-10-19 NOTE — DISCHARGE INSTRUCTIONS
ACTIVITY: Rest and take it easy for the first 24 hours.  A responsible adult is recommended to remain with you during that time.  It is normal to feel sleepy.  We encourage you to not do anything that requires balance, judgment or coordination.    MILD FLU-LIKE SYMPTOMS ARE NORMAL. YOU MAY EXPERIENCE GENERALIZED MUSCLE ACHES, THROAT IRRITATION, HEADACHE AND/OR SOME NAUSEA.    FOR 24 HOURS DO NOT:  Drive, operate machinery or run household appliances.  Drink beer or alcoholic beverages.   Make important decisions or sign legal documents.    SPECIAL INSTRUCTIONS: Follow any instructions you may have received from Dr Faulkner    Non-weight bearing on left leg.   Percocet (pain medication) prescribed for home.    Apply Ice, elevate left extremity.        DIET: To avoid nausea, slowly advance diet as tolerated, avoiding spicy or greasy foods for the first day.  Add more substantial food to your diet according to your physician's instructions. INCREASE FLUIDS AND FIBER TO AVOID CONSTIPATION.     SURGICAL DRESSING/BATHING: Keep dressing clean, dry and intact until your follow up appointment, cover with plastic to shower    FOLLOW-UP APPOINTMENT:  A follow-up appointment should be arranged with your doctor; call to schedule.    You should CALL YOUR PHYSICIAN if you develop:  Fever greater than 101 degrees F.  Pain not relieved by medication, or persistent nausea or vomiting.  Excessive bleeding (blood soaking through dressing) or unexpected drainage from the wound.  Extreme redness or swelling around the incision site, drainage of pus or foul smelling drainage.  Inability to urinate or empty your bladder within 8 hours.  Problems with breathing or chest pain.    You should call 911 if you develop problems with breathing or chest pain.  If you are unable to contact your doctor or surgical center, you should go to the nearest emergency room or urgent care center.     Physician's telephone #: [phone] Dr Faulkner    If any  questions arise, call your doctor.  If your doctor is not available, please feel free to call the Surgical Center at [phone]. The Contact Center is open Monday through Friday 7AM to 5PM and may speak to a nurse at [phone], or toll free at [phone].     A registered nurse may call you a few days after your surgery to see how you are doing after your procedure.    MEDICATIONS: Resume taking daily medication.  Take prescribed pain medication with food.  If no medication is prescribed, you may take non-aspirin pain medication if needed.  PAIN MEDICATION CAN BE VERY CONSTIPATING.  Take a stool softener or laxative such as senokot, pericolace, or milk of magnesia if needed.    Prescription given for Oxycodone-Acetaminophen (Percocet) - pain medication.     Last pain medication given at 1:38p.m. (Oxycodone).    If your physician has prescribed pain medication that includes Acetaminophen (Tylenol), do not take additional Acetaminophen (Tylenol) while taking the prescribed medication.    Depression / Suicide Risk    As you are discharged from this [facility] facility, it is important to learn how to keep safe from harming yourself.    Recognize the warning signs:  · Abrupt changes in personality, positive or negative- including increase in energy   · Giving away possessions  · Change in eating patterns- significant weight changes-  positive or negative  · Change in sleeping patterns- unable to sleep or sleeping all the time   · Unwillingness or inability to communicate  · Depression  · Unusual sadness, discouragement and loneliness  · Talk of wanting to die  · Neglect of personal appearance   · Rebelliousness- reckless behavior  · Withdrawal from people/activities they love  · Confusion- inability to concentrate     If you or a loved one observes any of these behaviors or has concerns about self-harm, here's what you can do:  · Talk about it- your feelings and reasons for harming yourself  · Remove any  means that you might use to hurt yourself (examples: pills, rope, extension cords, firearm)  · Get professional help from the community (Mental Health, Substance Abuse, psychological counseling)  · Do not be alone:Call your Safe Contact- someone whom you trust who will be there for you.  · Call your local CRISIS HOTLINE 837-1359 or 515-676-5346  · Call your local Children's Mobile Crisis Response Team [place] (704) 503-4451 or www.[employer]  · Call the toll free National Suicide Prevention Hotlines   · National Suicide Prevention Lifeline 415-735-ACSP (1836)  · National Hope Line Network 800-SUICIDE (234-2638)

## 2021-10-19 NOTE — ANESTHESIA TIME REPORT
Anesthesia Start and Stop Event Times     Date Time Event    10/19/2021 1027 Ready for Procedure     1100 Anesthesia Start     1255 Anesthesia Stop        Responsible Staff  10/19/21    Name Role Begin End    Arnaud Sharp M.D. Anesth 1100 1255        Preop Diagnosis (Free Text):  Pre-op Diagnosis     Tibial plateau fracture, left [S82.142A]        Preop Diagnosis (Codes):  Diagnosis Information     Diagnosis Code(s): Tibial plateau fracture, left [S82.142A]        Premium Reason  Non-Premium    Comments:

## 2021-10-19 NOTE — OR NURSING
1252 - Patient arrived from OR, report received from anesthesia and RN. Orders verified, oral airway in place w/ [id number]. Lung sounds present and clear throughout.   LLE dressing w/ immobilizer in place - clean/dry/intact, cap refill <2sec.     1308 - Oral airway discontinued, patient A&Ox4, denies pain     1338 - Patient tolerating liquids.   Oxycodone administered for pain     1348 - Patient spouse, Noemy updated via telephone. All questions answered     1355 - Fentanyl administered for continued pain     1435 - Subsequent dose of fentanyl given     1524 - Criteria met to transition patient to phase II recovery. Report given to GLENNA Mason and patient transported to phase II area

## 2021-10-19 NOTE — OP REPORT
DATE OF SERVICE:  10/19/2021     PREOPERATIVE DIAGNOSIS:  Depressed lateral plateau fracture, left knee.     POSTOPERATIVE DIAGNOSIS:  Depressed lateral plateau fracture, left knee.     OPERATIONS PERFORMED:  Open reduction and internal fixation of lateral plateau   via anterolateral approach and application of a freeze-dried allograft.     COMPLICATIONS:  None.     OPERATIVE FINDINGS:  As described depressed free large articular segment.     SURGEON:  Denis Faulkner MD     ASSISTANT(S):  Kevin Gasca MD     BLOOD LOSS:  Minimal.     TOURNIQUET TIME:  75 minutes.     MEDICATIONS UTILIZED:  Ancef, local infiltration of analgesics.     OPERATION:  The patient brought to the operating room awake, alert, and placed   on the operating table in supine position.  Left lower extremity was shaved,   scrubbed, prepped and draped in normal sterile routine fashion.  A tourniquet   was utilized.  The appropriate extremity identified and the operation began.     A curvilinear incision was made over the anteromedial tibia.  Subcutaneous   tissue was dissected down to Gerdy's tubercle and then the anterior   compartment was removed with some remaining fascia on to the tibial crest.  We   performed subperiosteal dissection along the anteromedial tibia until we   found the window, which we opened and then found the large depressed rotated   articular segment measuring approximately 2x2x3 cm.     We had previously identified the lateral meniscus tagged with #1 Vicryl to   hold it down position so we could actually look at the articular reduction.     At this point, we simply pieced the 2 large fragments together and then   brought the window back into its anatomic position and held with K wires and   then checked our radiographs AP and lateral demonstrated acceptable reduction.    We now simply chose an OIC plate and applied it in a standard fashion.  We   placed nonlocking screws proximally and then lag screws along the shaft to    buttress and hold the lateral window in position.     At this point, we used a 4.5 drill, drilled a hole anterolateral in the   metadiaphysis and then packed allograft into the defect.     We were satisfied with the reduction and the implant.  Final x-rays   demonstrated excellent alignment of the reduction and the fixation.  We were   satisfied and the operation was completed.     Tourniquet was deflated.  Bleeding was controlled.  We copiously irrigated.    We brought the anterior compartment back up into its anatomic position   utilizing the remnant fascia on to the tibial crest.  Then, 2-0 subcutaneous   and then a nylon, a knee immobilizer and the patient was then taken to   recovery room in stable condition.  No intraoperative or immediate   postoperative complications.  The patient tolerated the procedure well.        ______________________________  MD LORA Jara/[place]    DD:  10/19/2021 12:47  DT:  10/19/2021 13:09    Job#:  [id number]

## 2021-10-20 NOTE — OR NURSING
Pt arrived in Phase 2 at 1520,, stand pivot transfer to recliner chair, LLE elevated, VSS, LLE immobilizer in place, left tos wiggle, tender, left foot 2+ generalized edema and purple ecchymosis  with 1+ left pedal pulse present, pt states LLE pain is tolerable at 4-5 aching pain, wife and personal belongings at bedside, discharge instructions and RX reviewed with pt and wife, they verbalzied understanding of instructions, PIV removed, tip intact, discharged via wheelchair to home with wife at 1600.

## (undated) DEVICE — CHLORAPREP 26 ML APPLICATOR - ORANGE TINT(25/CA)

## (undated) DEVICE — DRAPE C ARMOR (12EA/CA)

## (undated) DEVICE — LACTATED RINGERS INJ 1000 ML - (14EA/CA 60CA/PF)

## (undated) DEVICE — CONTAINER SPECIMEN BAG OR - STERILE 4 OZ W/LID (100EA/CA)

## (undated) DEVICE — GLOVE BIOGEL SZ 7 SURGICAL PF LTX - (50PR/BX 4BX/CA)

## (undated) DEVICE — TUBING CLEARLINK DUO-VENT - C-FLO (48EA/CA)

## (undated) DEVICE — DRAPE LARGE 3 QUARTER - (20/CA)

## (undated) DEVICE — CANISTER SUCTION 3000ML MECHANICAL FILTER AUTO SHUTOFF MEDI-VAC NONSTERILE LF DISP  (40EA/CA)

## (undated) DEVICE — GLOVE BIOGEL PI INDICATOR SZ 8.0 SURGICAL PF LF -(50/BX 4BX/CA)

## (undated) DEVICE — SODIUM CHL IRRIGATION 0.9% 1000ML (12EA/CA)

## (undated) DEVICE — NEPTUNE 4 PORT MANIFOLD - (20/PK)

## (undated) DEVICE — SUTURE GENERAL

## (undated) DEVICE — DRILL BIT 3.5X110 QC OIC - (10TX2=20)

## (undated) DEVICE — GLOVE BIOGEL PI INDICATOR SZ 8.5 SURGICAL PF LF - (50PR/BX 4BX/CA)

## (undated) DEVICE — ELECTRODE DUAL RETURN W/ CORD - (50/PK)

## (undated) DEVICE — SYRINGE DISP. 60 CC LL - (30/BX, 12BX/CA)**WHEN THESE ARE GONE ORDER #500206**

## (undated) DEVICE — SUTURE 2-0 VICRYL PLUS CT-1 - 8 X 18 INCH(12/BX)

## (undated) DEVICE — PROTECTOR ULNA NERVE - (36PR/CA)

## (undated) DEVICE — PAD LAP STERILE 18 X 18 - (5/PK 40PK/CA)

## (undated) DEVICE — GLOVE BIOGEL PI ORTHO SZ 8.5 PF LF (40/BX)

## (undated) DEVICE — DRAPE U ORTHOPEDIC - (10/BX)

## (undated) DEVICE — PADDING CAST 6 IN STERILE - 6 X 4 YDS (24/CA)

## (undated) DEVICE — GLOVE BIOGEL PI INDICATOR SZ 6.5 SURGICAL PF LF - (50/BX 4BX/CA)

## (undated) DEVICE — BANDAGE ELASTIC 6 HONEYCOMB - 6X5YD LF (20/CA)"

## (undated) DEVICE — ELECTRODE 850 FOAM ADHESIVE - HYDROGEL RADIOTRNSPRNT (50/PK)

## (undated) DEVICE — SLEEVE, VASO, THIGH, MED

## (undated) DEVICE — MASK, LARYNGEAL AIRWAY #4

## (undated) DEVICE — KIT ANESTHESIA W/CIRCUIT & 3/LT BAG W/FILTER (20EA/CA)

## (undated) DEVICE — SET EXTENSION WITH 2 PORTS (48EA/CA) ***PART #2C8610 IS A SUBSTITUTE*****

## (undated) DEVICE — SET LEADWIRE 5 LEAD BEDSIDE DISPOSABLE ECG (1SET OF 5/EA)

## (undated) DEVICE — DRILL BIT 2.8MM X 155MM CALIBRATED (8TX2=16)

## (undated) DEVICE — TOWEL STOP TIMEOUT SAFETY FLAG (40EA/CA)

## (undated) DEVICE — DRAPE 36X28IN RAD CARM BND BG - (25/CA) O

## (undated) DEVICE — STAPLER SKIN DISP - (6/BX 10BX/CA) VISISTAT

## (undated) DEVICE — SENSOR SPO2 NEO LNCS ADHESIVE (20/BX) SEE USER NOTES

## (undated) DEVICE — IMMOBILIZER KNEE 24 INCH

## (undated) DEVICE — GLOVE BIOGEL ECLIPSE  PF LATEX SIZE 6.5 (50PR/BX)

## (undated) DEVICE — WRAP COBAN SELF-ADHERENT 6 IN X  5YDS STERILE TAN (12/CA)

## (undated) DEVICE — HEAD HOLDER JUNIOR/ADULT

## (undated) DEVICE — GOWN SURGICAL XX-LARGE - (28EA/CA) SIRUS NON REINFORCED

## (undated) DEVICE — GLOVE PROTEXIS PI MICRO SZ 8.5 (200PR/CA)

## (undated) DEVICE — GLOVE BIOGEL ECLIPSE PF LATEX SIZE 8.5 (50PR/BX)

## (undated) DEVICE — PACK LOWER EXTREMITY - (2/CA)

## (undated) DEVICE — NEEDLE DAVIS TONSIL 1/2 CIR - (2EA/PK20PK/BX)

## (undated) DEVICE — GOWN WARMING STANDARD FLEX - (30/CA)

## (undated) DEVICE — TOURNIQUET CUFF 34 X 4 ONE PORT DISP - STERILE (10/BX)

## (undated) DEVICE — SUTURE 3-0 ETHILON PS-1 (36PK/BX)

## (undated) DEVICE — GLOVE BIOGEL SZ 7.5 SURGICAL PF LTX - (50PR/BX 4BX/CA)

## (undated) DEVICE — SUTURE 1 VICRYL PLUS CTX - 8 X 18 INCH (12/BX)

## (undated) DEVICE — MASK ANESTHESIA ADULT  - (100/CA)

## (undated) DEVICE — GLOVE SZ 8 BIOGEL PI MICRO - PF LF (50PR/BX)

## (undated) DEVICE — SUCTION INSTRUMENT YANKAUER BULBOUS TIP W/O VENT (50EA/CA)